# Patient Record
Sex: MALE | NOT HISPANIC OR LATINO | URBAN - METROPOLITAN AREA
[De-identification: names, ages, dates, MRNs, and addresses within clinical notes are randomized per-mention and may not be internally consistent; named-entity substitution may affect disease eponyms.]

---

## 2018-08-21 ENCOUNTER — OFFICE VISIT (OUTPATIENT)
Dept: URGENT CARE | Facility: CLINIC | Age: 48
End: 2018-08-21
Payer: COMMERCIAL

## 2018-08-21 VITALS
WEIGHT: 196 LBS | RESPIRATION RATE: 14 BRPM | HEIGHT: 72 IN | TEMPERATURE: 97.6 F | DIASTOLIC BLOOD PRESSURE: 66 MMHG | HEART RATE: 62 BPM | OXYGEN SATURATION: 95 % | BODY MASS INDEX: 26.55 KG/M2 | SYSTOLIC BLOOD PRESSURE: 120 MMHG

## 2018-08-21 DIAGNOSIS — L23.7 ALLERGIC CONTACT DERMATITIS DUE TO PLANTS, EXCEPT FOOD: Primary | ICD-10-CM

## 2018-08-21 PROCEDURE — G0382 LEV 3 HOSP TYPE B ED VISIT: HCPCS | Performed by: FAMILY MEDICINE

## 2018-08-21 RX ORDER — METHYLPREDNISOLONE 4 MG/1
TABLET ORAL
Qty: 21 TABLET | Refills: 0 | Status: SHIPPED | OUTPATIENT
Start: 2018-08-21

## 2018-08-21 NOTE — PROGRESS NOTES
Assessment/Plan:      Diagnoses and all orders for this visit:    Allergic contact dermatitis due to plants, except food  -     Methylprednisolone 4 MG TBPK; Use as directed on package  -     betamethasone valerate (VALISONE) 0 1 % cream; Apply topically 2 (two) times a day          Subjective:     Patient ID: Bird Pierson is a 57 y o  male  Patient is a 57-year-old male who was cutting grass 4 - 5 days ago  Subsequently his eyelids have been puffy and itchy  He also has noted what he calls hives about his face and neck  Review of Systems   Constitutional: Negative  HENT: Negative  Eyes: Positive for itching  Respiratory: Negative  Cardiovascular: Negative  Musculoskeletal: Negative  Objective:     Physical Exam   Constitutional: He is oriented to person, place, and time  He appears well-developed and well-nourished  HENT:   Head: Normocephalic and atraumatic  Eyes: Conjunctivae are normal    His eyelids are puffy bilaterally  Pulmonary/Chest: Effort normal and breath sounds normal    Musculoskeletal: Normal range of motion  Neurological: He is alert and oriented to person, place, and time  Psychiatric: He has a normal mood and affect

## 2018-08-21 NOTE — PATIENT INSTRUCTIONS
We are treating this as an allergic reaction possibly to poison ivy  Use medications as written  Consider an antihistamine such as Benadryl or Zyrtec for the itch

## 2020-09-05 ENCOUNTER — OFFICE VISIT (OUTPATIENT)
Dept: URGENT CARE | Facility: CLINIC | Age: 50
End: 2020-09-05
Payer: COMMERCIAL

## 2020-09-05 VITALS
TEMPERATURE: 98.7 F | SYSTOLIC BLOOD PRESSURE: 145 MMHG | WEIGHT: 189 LBS | BODY MASS INDEX: 25.6 KG/M2 | HEART RATE: 66 BPM | HEIGHT: 72 IN | DIASTOLIC BLOOD PRESSURE: 75 MMHG

## 2020-09-05 DIAGNOSIS — L25.5 DERMATITIS DUE TO PLANTS, INCLUDING POISON IVY, SUMAC, AND OAK: Primary | ICD-10-CM

## 2020-09-05 PROCEDURE — G0382 LEV 3 HOSP TYPE B ED VISIT: HCPCS | Performed by: PHYSICIAN ASSISTANT

## 2020-09-05 PROCEDURE — 99283 EMERGENCY DEPT VISIT LOW MDM: CPT | Performed by: PHYSICIAN ASSISTANT

## 2020-09-05 RX ORDER — PREDNISONE 20 MG/1
TABLET ORAL
Qty: 24 TABLET | Refills: 0 | Status: SHIPPED | OUTPATIENT
Start: 2020-09-05

## 2020-09-05 NOTE — PATIENT INSTRUCTIONS
Poison Ivy   WHAT YOU NEED TO KNOW:   Poison ivy is a plant that can cause an itchy, uncomfortable rash on your skin  Poison ivy grows as a shrub or vine in woods, fields, and areas of thick [place]  It has 3 bright green leaves on each stem that turn red in [place]  DISCHARGE INSTRUCTIONS:   Medicines:   · Antiseptic or drying creams or ointments: These medicines may be used to dry out the rash and decrease the itching  These products may be available without a doctor's order  · Steroids: This medicine helps decrease itching and inflammation  It can be given as a cream to apply to your skin or as a pill  · Antihistamines: This medicine may help decrease itching and help you sleep  It is available without a doctor's order  · Take your medicine as directed  Contact your healthcare provider if you think your medicine is not helping or if you have side effects  Tell him of her if you are allergic to any medicine  Keep a list of the medicines, vitamins, and herbs you take  Include the amounts, and when and why you take them  Bring the list or the pill bottles to follow-up visits  Carry your medicine list with you in case of an emergency  Follow up with your healthcare provider as directed:  Write down your questions so you remember to ask them during your visits  How your poison ivy rash spreads: You cannot spread poison ivy by touching your rash or the liquid from your blisters  Poison ivy is spread only if you scratch your skin while it still has oil on it  You may think your rash is spreading because new rashes appear over a number of days  This happens because areas covered by thin skin break out in a rash first  Your face or forearms may develop a rash before thicker areas, such as the palms of your hands  Self-care:   · Keep your rash clean and dry:  Wash it with soap and water  Gently pat it dry with a clean towel  · Try not to scratch or rub your rash:   This can cause your skin to become infected  · Use a compress on your rash:  Dip a clean washcloth in cool water  Wring it out and place it on your rash  Leave the washcloth on your skin for 15 minutes  Do this at least 3 times per day  · Take a cornstarch or oatmeal bath: If your rash is too large to cover with wet washcloths, take 3 or 4 cornstarch baths daily  Mix 1 pound of cornstarch with a little water to make a paste  Add the paste to a tub full of water and mix well  You may also use colloidal oatmeal in the bath water  Use lukewarm water  Avoid hot water because it may cause your itching to increase  Prevent a poison ivy rash in the future:   · Wear skin protection:  Wear long pants, a long-sleeved shirt, and gloves  Use a skin block lotion to protect your skin from poison ivy oil  You can find this at a drugstore without a prescription  · Wash clothing after possible exposure: If you think you have been near a poison ivy plant, wash the clothes you were wearing separately from other clothes  Rinse the washing machine well after you take the clothes out  Scrub boots and shoes with warm, soapy water  Dry clean items and clothing that you cannot wash in water  Poison ivy oil is sticky and can stay on surfaces for a long time  It can cause a new rash even years later  · Bathe your pet:  Use warm water and shampoo on your pet's fur  This will prevent the spread of oil to your skin, car, and home  Wear long sleeves, long pants, and gloves while washing pets or any items that may have oil on them  · Reduce exposure to poison ivy:  Do not touch plants that look like poison ivy  Keep your yard free of poison ivy  While protecting your skin, remove the plant and the roots  Place them in a plastic bag and seal the bag tightly  · Do not burn poison ivy plants: This can spread the oil through the air  If you breathe the oil into your lungs, you could have swelling and serious breathing problems   Oil that clings to the fire yoon can land on your skin and cause a rash  Contact your healthcare provider if:   · You have pus, soft yellow scabs, or tenderness on the rash  · The itching gets worse or keeps you awake at night  · The rash covers more than 1/4 of your skin or spreads to your eyes, mouth, or genital area  · The rash is not better after 2 to 3 weeks  · You have tender, swollen glands on the sides of your neck  · You have swelling in your arms and legs  · You have questions or concerns about your condition or care  Return to the emergency department if:   · You have a fever  · You have redness, swelling, and tenderness around the rash  · You have trouble breathing  © 2017 [facility] Information is for End User's use only and may not be sold, redistributed or otherwise used for commercial purposes  All illustrations and images included in CareNotes® are the copyrighted property of A D A M , Inc  or Vince Tovar  The above information is an  only  It is not intended as medical advice for individual conditions or treatments  Talk to your doctor, nurse or pharmacist before following any medical regimen to see if it is safe and effective for you

## 2020-09-10 NOTE — PROGRESS NOTES
[facility]        NAME: Willa Yoon is a 52 y o  male  : 1970    MRN: [id number]  DATE: September 10, 2020  TIME: 2:49 AM    Assessment and Plan   Dermatitis due to plants, including poison ivy, sumac, and oak [L25 5]  1  Dermatitis due to plants, including poison ivy, sumac, and oak  predniSONE 20 mg tablet         Patient Instructions       Follow up with PCP in 3-5 days  Proceed to  ER if symptoms worsen  Chief Complaint     Chief Complaint   Patient presents with   [address]     poison ivy started last week, he was working outside and started seeing blisters and rash           History of Present Illness       82-year-old male presents the clinic with poison ivy that started last week  Patient states that he was working outside and must have been exposed to poison ivy  Patient started noticing blisters to several fingers and his hands, forearm  Patient states he has had poison ivy before and it is similar to previous episodes of poison that he has had in the past       Review of Systems   Review of Systems   Constitutional: Negative for chills and fever  HENT: Negative for facial swelling, sore throat, trouble swallowing and voice change  Eyes: Negative for photophobia, pain, discharge, redness, itching and visual disturbance  Respiratory: Negative for cough, chest tightness, shortness of breath and wheezing  Cardiovascular: Negative for chest pain and palpitations  Musculoskeletal: Negative for myalgias  Skin: Positive for rash  Negative for color change  Neurological: Negative for dizziness, weakness, light-headedness and headaches           Current Medications       Current Outpatient Medications:     betamethasone valerate (VALISONE) 0 1 % cream, Apply topically 2 (two) times a day (Patient not taking: Reported on 2020), Disp: 30 g, Rfl: 0    Methylprednisolone 4 MG TBPK, Use as directed on package (Patient not taking: Reported on 2020), Disp: 21 tablet, Rfl: 0    predniSONE 20 mg tablet, Take 3 tablets for 4 days, then  2 tablets for 4 days, then 1 tablet for 4 days  Take in the morning with food  , Disp: 24 tablet, Rfl: 0    Current Allergies     Allergies as of 09/05/2020    (No Known Allergies)            The following portions of the patient's history were reviewed and updated as appropriate: allergies, current medications, past family history, past medical history, past social history, past surgical history and problem list      History reviewed  No pertinent past medical history  Past Surgical History:   Procedure Laterality Date    HERNIA REPAIR      KNEE SURGERY         Family History   Problem Relation Age of Onset    No Known Problems Mother     Heart disease Father          Medications have been verified  Objective   /75   Pulse 66   Temp 98 7 °F (37 1 °C)   Ht 6' (1 829 m)   Wt 85 7 kg (189 lb)   BMI 25 63 kg/m²        Physical Exam     Physical Exam  Vitals signs and nursing note reviewed  Constitutional:       General: He is not in acute distress  Appearance: He is well-developed  He is not diaphoretic  HENT:      Head: Normocephalic and atraumatic  Pulmonary:      Effort: Pulmonary effort is normal    Musculoskeletal: Normal range of motion  Skin:     General: Skin is warm and dry  Findings: Rash present  No erythema  Rash is vesicular  Comments: Erythematous macular papular rash with small vesicles noted, blanchable, excoriations are noted  Neurological:      Mental Status: He is alert and oriented to person, place, and time

## 2021-03-29 DIAGNOSIS — Z23 ENCOUNTER FOR IMMUNIZATION: ICD-10-CM

## 2021-05-14 ENCOUNTER — OFFICE VISIT (OUTPATIENT)
Dept: URGENT CARE | Facility: CLINIC | Age: 51
End: 2021-05-14
Payer: COMMERCIAL

## 2021-05-14 VITALS — HEART RATE: 70 BPM | TEMPERATURE: 97.1 F | RESPIRATION RATE: 16 BRPM | OXYGEN SATURATION: 97 %

## 2021-05-14 DIAGNOSIS — H65.03 BILATERAL ACUTE SEROUS OTITIS MEDIA, RECURRENCE NOT SPECIFIED: Primary | ICD-10-CM

## 2021-05-14 PROCEDURE — 99283 EMERGENCY DEPT VISIT LOW MDM: CPT | Performed by: PHYSICIAN ASSISTANT

## 2021-05-14 PROCEDURE — G0382 LEV 3 HOSP TYPE B ED VISIT: HCPCS | Performed by: PHYSICIAN ASSISTANT

## 2021-05-14 RX ORDER — TENOFOVIR ALAFENAMIDE 25 MG/1
TABLET ORAL
COMMUNITY
Start: 2021-04-02

## 2021-05-14 NOTE — PATIENT INSTRUCTIONS
Serous Otitis Media   WHAT YOU NEED TO KNOW:   Serous otitis media is fluid trapped behind your tympanic membrane (eardrum), without an ear infection  Your eardrum is in your middle ear  Serous otitis media is also called otitis media with effusion  You may have fluid in your ear for months, but it usually goes away on its own  The fluid may be in one or both ears  The fluid may cause muffled sounds, and you may feel like your ears are full  Serous otitis media may be caused by an upper respiratory infection or allergies  It is most common in the fall and early spring  DISCHARGE INSTRUCTIONS:   Return to the emergency department if:   · You have a fever  · You have a sudden loss of hearing in your affected ear  · You develop a severe headache and stiff neck  · You have a seizure  Contact your healthcare provider if:   · You have fluid draining from your ear  · You have new symptoms  · You have questions or concerns about your condition or care  Follow up with your healthcare provider as directed: Your ears will need to be checked regularly  You may need to see a specialist  Write down your questions so you remember to ask them during your visits  © Copyright [address] Information is for End User's use only and may not be sold, redistributed or otherwise used for commercial purposes  All illustrations and images included in CareNotes® are the copyrighted property of A D A M , Inc  or [facility] Lisa Samaniego   The above information is an  only  It is not intended as medical advice for individual conditions or treatments  Talk to your doctor, nurse or pharmacist before following any medical regimen to see if it is safe and effective for you

## 2021-05-14 NOTE — PROGRESS NOTES
[facility]        NAME: Marisol Xiao is a 52 y o  male  : 1970    MRN: [id number]  DATE: May 17, 2021  TIME: 8:36 AM    Assessment and Plan   Bilateral acute serous otitis media, recurrence not specified [H65 03]  1  Bilateral acute serous otitis media, recurrence not specified           Patient Instructions       Follow up with PCP in 3-5 days  Proceed to  ER if symptoms worsen  Chief Complaint     Chief Complaint   Patient presents with    Earache     pt reports post-nasal drip for about three weeks  his left ear has felt full and painful for about three days         History of Present Illness         51-year-old male presents to the clinic with left ear fullness and irritation for approximately 3 days  Patient states that he has had postnasal drip for approximately 3 weeks due to allergies  Patient states that normally he would not come in for this but he is flying tomorrow to [place] and is concerned that his ear pain could worsen  Review of Systems   Review of Systems   Constitutional: Negative for chills and fever  HENT: Positive for ear pain  Negative for congestion, ear discharge, facial swelling, hearing loss, rhinorrhea, sore throat and tinnitus  Neurological: Negative for dizziness, light-headedness and headaches  Current Medications       Current Outpatient Medications:     Tenofovir Alafenamide Fumarate (Vemlidy) 25 MG TABS, Take 1 tablet by mouth once daily, Disp: , Rfl:     betamethasone valerate (VALISONE) 0 1 % cream, Apply topically 2 (two) times a day (Patient not taking: Reported on 2020), Disp: 30 g, Rfl: 0    Methylprednisolone 4 MG TBPK, Use as directed on package (Patient not taking: Reported on 2020), Disp: 21 tablet, Rfl: 0    predniSONE 20 mg tablet, Take 3 tablets for 4 days, then  2 tablets for 4 days, then 1 tablet for 4 days  Take in the morning with food   (Patient not taking: Reported on 2021), Disp: 24 tablet, Rfl: 0    Current Allergies     Allergies as of 05/14/2021    (No Known Allergies)            The following portions of the patient's history were reviewed and updated as appropriate: allergies, current medications, past family history, past medical history, past social history, past surgical history and problem list      History reviewed  No pertinent past medical history  Past Surgical History:   Procedure Laterality Date    HERNIA REPAIR      KNEE SURGERY         Family History   Problem Relation Age of Onset    No Known Problems Mother     Heart disease Father          Medications have been verified  Objective   Pulse 70   Temp (!) 97 1 °F (36 2 °C)   Resp 16   SpO2 97%   No LMP for male patient  Physical Exam     Physical Exam  Vitals signs and nursing note reviewed  Constitutional:       General: He is not in acute distress  Appearance: He is well-developed  He is not diaphoretic  HENT:      Head: Normocephalic and atraumatic  Right Ear: A middle ear effusion is present  Tympanic membrane is not erythematous  Left Ear: A middle ear effusion is present  Tympanic membrane is not erythematous  Nose: Nose normal    Eyes:      Conjunctiva/sclera: Conjunctivae normal    Pulmonary:      Effort: Pulmonary effort is normal    Musculoskeletal: Normal range of motion  Skin:     General: Skin is warm and dry  Neurological:      Mental Status: He is alert and oriented to person, place, and time

## 2022-08-15 LAB — HBA1C MFR BLD HPLC: 5.3 %

## 2022-11-11 ENCOUNTER — OFFICE VISIT (OUTPATIENT)
Dept: ENDOCRINOLOGY | Facility: HOSPITAL | Age: 52
End: 2022-11-11

## 2022-11-11 VITALS
HEIGHT: 71 IN | DIASTOLIC BLOOD PRESSURE: 82 MMHG | WEIGHT: 202 LBS | SYSTOLIC BLOOD PRESSURE: 138 MMHG | HEART RATE: 84 BPM | BODY MASS INDEX: 28.28 KG/M2

## 2022-11-11 DIAGNOSIS — E04.9 GOITER: ICD-10-CM

## 2022-11-11 DIAGNOSIS — E04.1 LEFT THYROID NODULE: Primary | ICD-10-CM

## 2022-11-11 DIAGNOSIS — Z92.3 PERSONAL HISTORY OF RADIATION EXPOSURE: ICD-10-CM

## 2022-11-11 NOTE — PROGRESS NOTES
11/13/2022    Assessment/Plan      Diagnoses and all orders for this visit:    Left thyroid nodule  -     TSH, 3rd generation Lab Collect  -     T4, free Lab Collect  -     Thyroid Peroxidase and Thyroglobulin Antibodies  -     US thyroid; Future    Goiter  -     TSH, 3rd generation Lab Collect  -     T4, free Lab Collect  -     Thyroid Peroxidase and Thyroglobulin Antibodies  -     US thyroid; Future    Personal history of radiation exposure  -     TSH, 3rd generation Lab Collect  -     T4, free Lab Collect  -     Thyroid Peroxidase and Thyroglobulin Antibodies  -     US thyroid; Future        Assessment/Plan:  1  Left-sided thyroid nodule  He has had possible radiation exposure due to his previous residence in the [place] and possible exposure to the [place] radiation  He has not had an ultrasound performed since 2017  I will repeat a thyroid ultrasound now  I will also check thyroid levels with antibodies  2  Goiter  He has no compressive thyroid symptoms  I will be checking this with thyroid ultrasound  I have asked him to get a TSH with free T4, thyroid peroxidase and thyroglobulin antibodies along with a thyroid ultrasound performed at his earliest convenience  Follow-up will be determined based on these studies  CC:  Thyroid consult    History of Present Illness     HPI: Romario Bajwa is a 50y o  year old male with history of thyroid nodule and goiter for evaluation/consult  He had a thyroid nodule noted in 2017 when he had a sharp pain in his left neck after sneezing  He was seen by endocrinology and was followed over time as thyroid nodule was quite small and did not need biopsy at that time  He has also had a goiter in the past   Of note, [facility] when the [place] occurred and he was some radiation exposure in the area where he lived which was 10 times normal radiation    He does also have a family history of a mother who has a goiter and is post surgery  He is here as he notices something on the left side of his throat if he so has post year on the left  When he coughs the left is irritated and when he thinks he can sometimes have pain in the left side of his neck with certain notes  He has no actual difficulty with swallowing or prevention of swallowing  He has no heat or cold intolerance, palpitation, or fatigue  He denies insomnia but is a light sleeper and will wake frequently at night  He denies diarrhea or constipation, anxiety or depression  He does have a slight tremor  He denies dry skin, brittle nails, or hair loss  He denies diplopia  Weight is stable  Review of Systems   Constitutional: Negative for fatigue and unexpected weight change  HENT: Positive for trouble swallowing  Negative for hearing loss and tinnitus  Eyes: Positive for visual disturbance  Wears glasses  Slight diplopia again, eye wanders some more recently  Respiratory: Negative for chest tightness and shortness of breath  Cardiovascular: Negative for chest pain, palpitations and leg swelling  Gastrointestinal: Negative for abdominal pain, constipation, diarrhea and nausea  Endocrine: Negative for cold intolerance and heat intolerance  Musculoskeletal: Negative for arthralgias and back pain  Skin: Negative for rash  No dry skin  No brittle nails  No hair loss  Some thinning  Neurological: Positive for tremors and headaches  Negative for dizziness and light-headedness  Slight tremor, occasional headaches  Psychiatric/Behavioral: Negative for dysphoric mood and sleep disturbance  The patient is not nervous/anxious  Will wake frequently over night as light sleeper          Historical Information   Past Medical History:   Diagnosis Date   • Benign essential tremor    • Hepatitis B    • Hyperlipidemia      Past Surgical History:   Procedure Laterality Date   • HERNIA REPAIR Right    • KNEE SURGERY Right     torn meniscus   • NASAL SEPTUM SURGERY      septum once, rhinoscopy twice   • STRABISMUS SURGERY Left    • VASECTOMY Bilateral      Social History   Social History     Substance and Sexual Activity   Alcohol Use Yes    Comment: occasionally     Social History     Substance and Sexual Activity   Drug Use Never     Social History     Tobacco Use   Smoking Status Never Smoker   Smokeless Tobacco Never Used     Family History:   Family History   Problem Relation Age of Onset   • Thyroid disease unspecified Mother         goiter removed   • Heart disease Father    • Heart attack Father    • No Known Problems Son    • No Known Problems Son    • No Known Problems Son        Meds/Allergies   Current Outpatient Medications   Medication Sig Dispense Refill   • Tenofovir Alafenamide Fumarate (Vemlidy) 25 MG TABS Take 1 tablet by mouth once daily       No current facility-administered medications for this visit  No Known Allergies    Objective   Vitals: Blood pressure 138/82, pulse 84, height 5' 10 5" (1 791 m), weight 91 6 kg (202 lb)  Invasive Devices  Report    None                 Physical Exam  Vitals reviewed  Constitutional:       Appearance: Normal appearance  He is well-developed  HENT:      Head: Normocephalic and atraumatic  Eyes:      Extraocular Movements: Extraocular movements intact  Conjunctiva/sclera: Conjunctivae normal       Comments: No lid lag, stare, proptosis, or periorbital edema  Neck:      Thyroid: No thyromegaly  Vascular: No carotid bruit  Comments: tender over the left thyroid to palpitation  No bruits over the thyroid gland  Cardiovascular:      Rate and Rhythm: Normal rate and regular rhythm  Heart sounds: Normal heart sounds  No murmur heard  Pulmonary:      Effort: Pulmonary effort is normal       Breath sounds: Normal breath sounds  No wheezing  Abdominal:      General: Bowel sounds are normal       Palpations: Abdomen is soft  Tenderness: There is no abdominal tenderness  Musculoskeletal:         General: No deformity  Normal range of motion  Cervical back: Normal range of motion and neck supple  Right lower leg: No edema  Left lower leg: No edema  Comments: No tremor of the outstretched hands  No spinous process tenderness  No CVA tenderness  Lymphadenopathy:      Cervical: No cervical adenopathy  Skin:     General: Skin is warm and dry  Findings: No erythema or rash  Neurological:      Mental Status: He is alert and oriented to person, place, and time  Deep Tendon Reflexes: Reflexes are normal and symmetric  Comments: Patellar deep tendon reflexes normal          The history was obtained from the review of the chart and from the patient  Lab Results:      Blood work done on 08/15/2022 showed a TSH of 1 4  Blood work done on 12/06/2021 showed a TSH of 1 46  Thyroid ultrasound:     Thyroid ultrasound was performed in November of 2017 demonstrating a right lobe of the thyroid measuring 6 8 cm and a left lobe of the thyroid measuring 6 1 cm at their largest dimension  There was a 6 mm left-sided thyroid nodule        Future Appointments   Date Time Provider Yissel Bailey   11/29/2022  8:00 AM [facility] US UP 1 [facility] UP US [facility]B UPPER PER

## 2022-11-12 LAB
T4 FREE SERPL-MCNC: 1 NG/DL (ref 0.8–1.8)
THYROPEROXIDASE AB SERPL-ACNC: 1 IU/ML
TSH SERPL-ACNC: 0.84 MIU/L (ref 0.4–4.5)

## 2022-11-29 ENCOUNTER — HOSPITAL ENCOUNTER (OUTPATIENT)
Dept: ULTRASOUND IMAGING | Facility: CLINIC | Age: 52
Discharge: HOME | End: 2022-11-29

## 2022-11-29 DIAGNOSIS — E04.9 GOITER: ICD-10-CM

## 2022-11-29 DIAGNOSIS — Z92.3 PERSONAL HISTORY OF RADIATION EXPOSURE: ICD-10-CM

## 2022-11-29 DIAGNOSIS — E04.1 LEFT THYROID NODULE: ICD-10-CM

## 2022-12-02 DIAGNOSIS — E04.1 LEFT THYROID NODULE: Primary | ICD-10-CM

## 2023-02-02 ENCOUNTER — OFFICE VISIT (OUTPATIENT)
Dept: URGENT CARE | Facility: CLINIC | Age: 53
End: 2023-02-02

## 2023-02-02 VITALS — OXYGEN SATURATION: 97 % | TEMPERATURE: 97.8 F | RESPIRATION RATE: 18 BRPM | HEART RATE: 68 BPM

## 2023-02-02 DIAGNOSIS — S61.219A LACERATION WITHOUT FOREIGN BODY OF UNSPECIFIED FINGER WITHOUT DAMAGE TO NAIL, INITIAL ENCOUNTER: ICD-10-CM

## 2023-02-02 DIAGNOSIS — Z23 NEED FOR TETANUS BOOSTER: Primary | ICD-10-CM

## 2023-02-02 RX ORDER — CEPHALEXIN 500 MG/1
500 CAPSULE ORAL EVERY 8 HOURS SCHEDULED
Qty: 30 CAPSULE | Refills: 0 | Status: SHIPPED | OUTPATIENT
Start: 2023-02-02 | End: 2023-02-12

## 2023-02-02 NOTE — PROGRESS NOTES
[address] [facility]        NAME: Jaclyn Tan is a 50 y o  male  : 1970    MRN: [id number]  DATE: 2023  TIME: 5:59 PM    Pulse 68   Temp 97 8 °F (36 6 °C)   Resp 18   SpO2 97%     Assessment and Plan   Need for tetanus booster [Z23]  1  Need for tetanus booster  Tdap Vaccine greater than or equal to 6yo      2  Laceration without foreign body of unspecified finger without damage to nail, initial encounter  cephalexin (KEFLEX) 500 mg capsule            Patient Instructions       Follow up with PCP in 3-5 days  Proceed to  ER if symptoms worsen  Chief Complaint     Chief Complaint   Patient presents with   • Finger Laceration     Pt cut the tip of his right middle finger with a mandalin  He has been keeping it covered and using neosporin  History of Present Illness       Pt with right 3rd finger tip laceration from vegetable slicer       Review of Systems   Review of Systems   Constitutional: Negative  HENT: Negative  Eyes: Negative  Respiratory: Negative  Cardiovascular: Negative  Gastrointestinal: Negative  Endocrine: Negative  Genitourinary: Negative  Musculoskeletal: Negative  Skin: Negative  Allergic/Immunologic: Negative  Neurological: Negative  Hematological: Negative  Psychiatric/Behavioral: Negative  All other systems reviewed and are negative          Current Medications       Current Outpatient Medications:   •  cephalexin (KEFLEX) 500 mg capsule, Take 1 capsule (500 mg total) by mouth every 8 (eight) hours for 10 days, Disp: 30 capsule, Rfl: 0  •  Tenofovir Alafenamide Fumarate (Vemlidy) 25 MG TABS, Take 1 tablet by mouth once daily, Disp: , Rfl:     Current Allergies     Allergies as of 2023   • (No Known Allergies)            The following portions of the patient's history were reviewed and updated as appropriate: allergies, current medications, past family history, past medical history, past social history, past surgical history and problem list      Past Medical History:   Diagnosis Date   • Benign essential tremor    • Hepatitis B    • Hyperlipidemia        Past Surgical History:   Procedure Laterality Date   • HERNIA REPAIR Right    • KNEE SURGERY Right     torn meniscus   • NASAL SEPTUM SURGERY      septum once, rhinoscopy twice   • STRABISMUS SURGERY Left    • VASECTOMY Bilateral        Family History   Problem Relation Age of Onset   • Thyroid disease unspecified Mother         goiter removed   • Heart disease Father    • Heart attack Father    • No Known Problems Son    • No Known Problems Son    • No Known Problems Son          Medications have been verified  Objective   Pulse 68   Temp 97 8 °F (36 6 °C)   Resp 18   SpO2 97%        Physical Exam     Physical Exam  Vitals and nursing note reviewed  Constitutional:       Appearance: Normal appearance  He is normal weight  Comments: Wound cleansed with sure cleans and gel foam applied and bulky dressing    HENT:      Head: Normocephalic and atraumatic  Right Ear: Tympanic membrane, ear canal and external ear normal       Left Ear: Tympanic membrane, ear canal and external ear normal       Nose: Nose normal       Mouth/Throat:      Mouth: Mucous membranes are moist       Pharynx: Oropharynx is clear  Eyes:      Extraocular Movements: Extraocular movements intact  Conjunctiva/sclera: Conjunctivae normal       Pupils: Pupils are equal, round, and reactive to light  Cardiovascular:      Rate and Rhythm: Normal rate and regular rhythm  Pulses: Normal pulses  Heart sounds: Normal heart sounds  Pulmonary:      Effort: Pulmonary effort is normal       Breath sounds: Normal breath sounds  Abdominal:      General: Abdomen is flat  Bowel sounds are normal       Palpations: Abdomen is soft  Musculoskeletal:         General: Normal range of motion  Cervical back: Normal range of motion and neck supple        Comments: Right 3rd finger tip avulsion laceration    Skin:     General: Skin is warm  Capillary Refill: Capillary refill takes less than 2 seconds  Neurological:      General: No focal deficit present  Mental Status: He is alert and oriented to person, place, and time     Psychiatric:         Mood and Affect: Mood normal          Behavior: Behavior normal

## 2023-02-08 ENCOUNTER — OFFICE VISIT (OUTPATIENT)
Dept: URGENT CARE | Facility: CLINIC | Age: 53
End: 2023-02-08

## 2023-02-08 VITALS
SYSTOLIC BLOOD PRESSURE: 142 MMHG | BODY MASS INDEX: 27.58 KG/M2 | WEIGHT: 197 LBS | HEIGHT: 71 IN | OXYGEN SATURATION: 99 % | HEART RATE: 66 BPM | TEMPERATURE: 97.4 F | DIASTOLIC BLOOD PRESSURE: 82 MMHG | RESPIRATION RATE: 16 BRPM

## 2023-02-08 DIAGNOSIS — Z51.89 VISIT FOR WOUND CARE: Primary | ICD-10-CM

## 2023-02-08 NOTE — PROGRESS NOTES
[facility]        NAME: Oziel Spencer is a 50 y o  male  : 1970    MRN: [id number]  DATE: 2023  TIME: 5:43 PM    /82   Pulse 66   Temp (!) 97 4 °F (36 3 °C)   Resp 16   Ht 5' 11" (1 803 m)   Wt 89 4 kg (197 lb)   SpO2 99%   BMI 27 48 kg/m²     Assessment and Plan   Visit for wound care [Z51 89]  1  Visit for wound care              Patient Instructions       Follow up with PCP in 3-5 days  Proceed to  ER if symptoms worsen  Chief Complaint     Chief Complaint   Patient presents with   • Skin Problem     Pt returns for wound check  Seen on  for right middle finger injury  Denies fevers  Dressing remains intact  Did not start Keflex  History of Present Illness       Pt with right 3rd finger avulision laceration check       Review of Systems   Review of Systems   Constitutional: Negative  HENT: Negative  Eyes: Negative  Respiratory: Negative  Cardiovascular: Negative  Gastrointestinal: Negative  Endocrine: Negative  Genitourinary: Negative  Musculoskeletal: Negative  Skin: Negative  Allergic/Immunologic: Negative  Neurological: Negative  Hematological: Negative  Psychiatric/Behavioral: Negative  All other systems reviewed and are negative          Current Medications       Current Outpatient Medications:   •  Tenofovir Alafenamide Fumarate (Vemlidy) 25 MG TABS, Take 1 tablet by mouth once daily, Disp: , Rfl:   •  cephalexin (KEFLEX) 500 mg capsule, Take 1 capsule (500 mg total) by mouth every 8 (eight) hours for 10 days (Patient not taking: Reported on 2023), Disp: 30 capsule, Rfl: 0    Current Allergies     Allergies as of 2023   • (No Known Allergies)            The following portions of the patient's history were reviewed and updated as appropriate: allergies, current medications, past family history, past medical history, past social history, past surgical history and problem list      Past Medical History: Diagnosis Date   • Benign essential tremor    • Hepatitis B    • Hyperlipidemia        Past Surgical History:   Procedure Laterality Date   • HERNIA REPAIR Right    • KNEE SURGERY Right     torn meniscus   • NASAL SEPTUM SURGERY      septum once, rhinoscopy twice   • STRABISMUS SURGERY Left    • VASECTOMY Bilateral        Family History   Problem Relation Age of Onset   • Thyroid disease unspecified Mother         goiter removed   • Heart disease Father    • Heart attack Father    • No Known Problems Son    • No Known Problems Son    • No Known Problems Son          Medications have been verified  Objective   /82   Pulse 66   Temp (!) 97 4 °F (36 3 °C)   Resp 16   Ht 5' 11" (1 803 m)   Wt 89 4 kg (197 lb)   SpO2 99%   BMI 27 48 kg/m²        Physical Exam     Physical Exam  Vitals and nursing note reviewed  Constitutional:       Appearance: Normal appearance  He is normal weight  HENT:      Head: Normocephalic and atraumatic  Right Ear: Tympanic membrane, ear canal and external ear normal       Left Ear: Tympanic membrane, ear canal and external ear normal       Nose: Nose normal       Mouth/Throat:      Mouth: Mucous membranes are moist       Pharynx: Oropharynx is clear  Eyes:      Extraocular Movements: Extraocular movements intact  Conjunctiva/sclera: Conjunctivae normal       Pupils: Pupils are equal, round, and reactive to light  Cardiovascular:      Rate and Rhythm: Normal rate and regular rhythm  Pulses: Normal pulses  Heart sounds: Normal heart sounds  Pulmonary:      Effort: Pulmonary effort is normal       Breath sounds: Normal breath sounds  Abdominal:      General: Abdomen is flat  Bowel sounds are normal       Palpations: Abdomen is soft  Musculoskeletal:         General: Normal range of motion  Cervical back: Normal range of motion and neck supple        Comments: Right 3rd finger gel foam intact healing well no erythema no swelling no tenderness  Sensation wnl    Skin:     General: Skin is warm  Neurological:      General: No focal deficit present  Mental Status: He is alert and oriented to person, place, and time     Psychiatric:         Mood and Affect: Mood normal

## 2023-03-03 ENCOUNTER — OFFICE VISIT (OUTPATIENT)
Dept: GASTROENTEROLOGY | Facility: CLINIC | Age: 53
End: 2023-03-03

## 2023-03-03 ENCOUNTER — TELEPHONE (OUTPATIENT)
Dept: GASTROENTEROLOGY | Facility: CLINIC | Age: 53
End: 2023-03-03

## 2023-03-03 VITALS
WEIGHT: 199 LBS | DIASTOLIC BLOOD PRESSURE: 78 MMHG | SYSTOLIC BLOOD PRESSURE: 138 MMHG | BODY MASS INDEX: 27.86 KG/M2 | HEIGHT: 71 IN

## 2023-03-03 DIAGNOSIS — R10.13 EPIGASTRIC PAIN: Primary | ICD-10-CM

## 2023-03-03 DIAGNOSIS — Z12.11 COLON CANCER SCREENING: ICD-10-CM

## 2023-03-03 DIAGNOSIS — B19.10 HEPATITIS B INFECTION WITHOUT DELTA AGENT WITHOUT HEPATIC COMA, UNSPECIFIED CHRONICITY: ICD-10-CM

## 2023-03-03 RX ORDER — FAMOTIDINE 40 MG/1
40 TABLET, FILM COATED ORAL
Qty: 30 TABLET | Refills: 2 | Status: SHIPPED | OUTPATIENT
Start: 2023-03-03

## 2023-03-03 NOTE — LETTER
March 3, 2023     Elyssa Robles MD  [facility] [address]  [address]    Patient: Dakotah Rosales   YOB: 1970   Date of Visit: 3/3/2023       Dear Dr Sana Sauceda: Thank you for referring Dakotah Rosales to me for evaluation  Below are my notes for this consultation  If you have questions, please do not hesitate to call me  I look forward to following your patient along with you  Sincerely,        Nathan Baires MD        CC: MD Nathan Armstrong MD  3/3/2023  3:50 PM  Sign when Signing Visit  [address] [facility] - Outpatient Consultation  Dakotah Rosales 50 y o  male MRN: [id number]  Encounter: [id number]          ASSESSMENT AND PLAN:      1  Epigastric pain  Several months of upper abdominal pain  Suspicious for GERD  At risk for H  pylori secondary to his emigration from [place]  - EGD; Future  - famotidine (PEPCID) 40 MG tablet; Take 1 tablet (40 mg total) by mouth daily at bedtime  Dispense: 30 tablet; Refill: 2    2  Colon cancer screening  Average risk for colon cancer screening   - Colonoscopy; Future  - sodium picosulfate, magnesium oxide, citric acid 10-3 5-12 MG-GM -GM/160ML SOLN; Take 160 mL by mouth once for 1 dose Take 160 mL by mouth once for 1 dose  Dispense: 160 mL; Refill: 0    3  Chronic hepatitis B  On Vemlidy  Followed by Dr Raymond Irby at [facility]    ______________________________________________________________________    HPI: The patient is seen in the office today for upper abdominal pain and to discuss colon cancer screening  Has a history of chronic hepatitis B and is currently seeing Dr Raymond Irby at [facility]  He reports over the last several months having increasing epigastric abdominal burning with some regurgitation  He denies any real heartburn  He denies any dysphagia, odynophagia, or early satiety  Red wine seems to make this worse  Coughing sometimes makes it worse  Spicy foods also bother him    Denies any significant NSAIDs  He has not tried antacids  His weight is stable  His bowels are normal       REVIEW OF SYSTEMS:    CONSTITUTIONAL: Denies any fever, chills, rigors, and weight loss  HEENT: No earache or tinnitus  Denies hearing loss or visual disturbances  CARDIOVASCULAR: No chest pain or palpitations  RESPIRATORY: Denies any cough, hemoptysis, shortness of breath or dyspnea on exertion  GASTROINTESTINAL: As noted in the History of Present Illness  GENITOURINARY: No problems with urination  Denies any hematuria or dysuria  NEUROLOGIC: No dizziness or vertigo, denies headaches  MUSCULOSKELETAL: Denies any muscle or joint pain  SKIN: Denies skin rashes or itching  ENDOCRINE: Denies excessive thirst  Denies intolerance to heat or cold  PSYCHOSOCIAL: Denies depression or anxiety  Denies any recent memory loss         Historical Information   Past Medical History:   Diagnosis Date   • Benign essential tremor    • Hepatitis B    • Hyperlipidemia      Past Surgical History:   Procedure Laterality Date   • HERNIA REPAIR Right    • KNEE SURGERY Right     torn meniscus   • NASAL SEPTUM SURGERY      septum once, rhinoscopy twice   • STRABISMUS SURGERY Left    • VASECTOMY Bilateral      Social History   Social History     Substance and Sexual Activity   Alcohol Use Yes    Comment: occasionally     Social History     Substance and Sexual Activity   Drug Use Never     Social History     Tobacco Use   Smoking Status Never   Smokeless Tobacco Never     Family History   Problem Relation Age of Onset   • Thyroid disease unspecified Mother         goiter removed   • Heart disease Father    • Heart attack Father    • No Known Problems Son    • No Known Problems Son    • No Known Problems Son        Meds/Allergies       Current Outpatient Medications:   •  famotidine (PEPCID) 40 MG tablet  •  sodium picosulfate, magnesium oxide, citric acid 10-3 5-12 MG-GM -GM/160ML SOLN  •  Tenofovir Alafenamide Fumarate (Vemlidy) 25 MG TABS    No Known Allergies        Objective     Blood pressure 138/78, height 5' 11" (1 803 m), weight 90 3 kg (199 lb)  Body mass index is 27 75 kg/m²  PHYSICAL EXAM:      General Appearance:   Alert, cooperative, no distress   HEENT:   Normocephalic, atraumatic, anicteric      Neck:  Supple, symmetrical, trachea midline   Lungs:   Clear to auscultation bilaterally; no rales, rhonchi or wheezing; respirations unlabored    Heart[de-identified]   Regular rate and rhythm; no murmur, rub, or gallop  Abdomen:   Soft, non-tender, non-distended; normal bowel sounds; no masses, no organomegaly    Genitalia:   Deferred    Rectal:   Deferred    Extremities:  No cyanosis, clubbing or edema    Pulses:  2+ and symmetric    Skin:  No jaundice, rashes, or lesions    Lymph nodes:  No palpable cervical lymphadenopathy        Lab Results:   No visits with results within 1 Day(s) from this visit  Latest known visit with results is:   Orders Only on 11/11/2022   Component Date Value   • Thyroid Peroxidase Antib* 11/11/2022 1    • Free t4 11/11/2022 1 0    • TSH 11/11/2022 0 84          Radiology Results:   No results found

## 2023-03-03 NOTE — LETTER
March 3, 2023     [facility] Hayden MD  [facility] [address] [address]    Patient: Ayanna Dawson   YOB: 1970   Date of Visit: 3/3/2023       Dear Dr Donny Severance: Thank you for referring Ayanna Dawson to me for evaluation  Below are my notes for this consultation  If you have questions, please do not hesitate to call me  I look forward to following your patient along with you  Sincerely,        Robby Anderson MD        CC: MD Robby Tom MD  3/3/2023  3:48 PM  Sign when Signing Visit  [address] [facility] [facility] - Outpatient Consultation  Ayanna Dawson 50 y o  male MRN: [id number]  Encounter: [id number]          ASSESSMENT AND PLAN:      1  Epigastric pain  Several months of upper abdominal pain  Suspicious for GERD  At risk for H  pylori secondary to his emigration from [place]  - EGD; Future  - famotidine (PEPCID) 40 MG tablet; Take 1 tablet (40 mg total) by mouth daily at bedtime  Dispense: 30 tablet; Refill: 2    2  Colon cancer screening  Average risk for colon cancer screening   - Colonoscopy; Future  - sodium picosulfate, magnesium oxide, citric acid 10-3 5-12 MG-GM -GM/160ML SOLN; Take 160 mL by mouth once for 1 dose Take 160 mL by mouth once for 1 dose  Dispense: 160 mL; Refill: 0    3  And is currently seeing Dr Skashi Muñiz without delta agent without hepatic coma, unspecified chronicity  ***    ______________________________________________________________________    HPI: The patient is seen in the office today for upper abdominal pain and to discuss colon cancer screening  Has a history of chronic hepatitis B and is currently seeing Dr Jesus Trent at [facility]  He reports over the last several months having increasing epigastric abdominal burning with some regurgitation  He denies any real heartburn  He denies any dysphagia, odynophagia, or early satiety  Red wine seems to make this worse    Coughing sometimes makes it worse   Spicy foods also bother him  Denies any significant NSAIDs  He has not tried antacids  His weight is stable  His bowels are normal       REVIEW OF SYSTEMS:    CONSTITUTIONAL: Denies any fever, chills, rigors, and weight loss  HEENT: No earache or tinnitus  Denies hearing loss or visual disturbances  CARDIOVASCULAR: No chest pain or palpitations  RESPIRATORY: Denies any cough, hemoptysis, shortness of breath or dyspnea on exertion  GASTROINTESTINAL: As noted in the History of Present Illness  GENITOURINARY: No problems with urination  Denies any hematuria or dysuria  NEUROLOGIC: No dizziness or vertigo, denies headaches  MUSCULOSKELETAL: Denies any muscle or joint pain  SKIN: Denies skin rashes or itching  ENDOCRINE: Denies excessive thirst  Denies intolerance to heat or cold  PSYCHOSOCIAL: Denies depression or anxiety  Denies any recent memory loss         Historical Information   Past Medical History:   Diagnosis Date   • Benign essential tremor    • Hepatitis B    • Hyperlipidemia      Past Surgical History:   Procedure Laterality Date   • HERNIA REPAIR Right    • KNEE SURGERY Right     torn meniscus   • NASAL SEPTUM SURGERY      septum once, rhinoscopy twice   • STRABISMUS SURGERY Left    • VASECTOMY Bilateral      Social History   Social History     Substance and Sexual Activity   Alcohol Use Yes    Comment: occasionally     Social History     Substance and Sexual Activity   Drug Use Never     Social History     Tobacco Use   Smoking Status Never   Smokeless Tobacco Never     Family History   Problem Relation Age of Onset   • Thyroid disease unspecified Mother         goiter removed   • Heart disease Father    • Heart attack Father    • No Known Problems Son    • No Known Problems Son    • No Known Problems Son        Meds/Allergies       Current Outpatient Medications:   •  famotidine (PEPCID) 40 MG tablet  •  sodium picosulfate, magnesium oxide, citric acid 10-3 5-12 MG-GM -GM/160ML SOLN  •  Tenofovir Alafenamide Fumarate (Vemlidy) 25 MG TABS    No Known Allergies        Objective     Blood pressure 138/78, height 5' 11" (1 803 m), weight 90 3 kg (199 lb)  Body mass index is 27 75 kg/m²  PHYSICAL EXAM:      General Appearance:   Alert, cooperative, no distress   HEENT:   Normocephalic, atraumatic, anicteric      Neck:  Supple, symmetrical, trachea midline   Lungs:   Clear to auscultation bilaterally; no rales, rhonchi or wheezing; respirations unlabored    Heart[de-identified]   Regular rate and rhythm; no murmur, rub, or gallop  Abdomen:   Soft, non-tender, non-distended; normal bowel sounds; no masses, no organomegaly    Genitalia:   Deferred    Rectal:   Deferred    Extremities:  No cyanosis, clubbing or edema    Pulses:  2+ and symmetric    Skin:  No jaundice, rashes, or lesions    Lymph nodes:  No palpable cervical lymphadenopathy        Lab Results:   No visits with results within 1 Day(s) from this visit  Latest known visit with results is:   Orders Only on 11/11/2022   Component Date Value   • Thyroid Peroxidase Antib* 11/11/2022 1    • Free t4 11/11/2022 1 0    • TSH 11/11/2022 0 84          Radiology Results:   No results found

## 2023-03-03 NOTE — PROGRESS NOTES
[facility] - Outpatient Consultation  Keshav White 50 y o  male MRN: [id number]  Encounter: [id number]          ASSESSMENT AND PLAN:      1  Epigastric pain  Several months of upper abdominal pain  Suspicious for GERD  At risk for H  pylori secondary to his emigration from [place]  - EGD; Future  - famotidine (PEPCID) 40 MG tablet; Take 1 tablet (40 mg total) by mouth daily at bedtime  Dispense: 30 tablet; Refill: 2    2  Colon cancer screening  Average risk for colon cancer screening   - Colonoscopy; Future  - sodium picosulfate, magnesium oxide, citric acid 10-3 5-12 MG-GM -GM/160ML SOLN; Take 160 mL by mouth once for 1 dose Take 160 mL by mouth once for 1 dose  Dispense: 160 mL; Refill: 0    3  Chronic hepatitis B  On Vemlidy  Followed by Dr Isaias Cedeno at [facility]    ______________________________________________________________________    HPI: The patient is seen in the office today for upper abdominal pain and to discuss colon cancer screening  Has a history of chronic hepatitis B and is currently seeing Dr Isaias Cedeno at [facility]  He reports over the last several months having increasing epigastric abdominal burning with some regurgitation  He denies any real heartburn  He denies any dysphagia, odynophagia, or early satiety  Red wine seems to make this worse  Coughing sometimes makes it worse  Spicy foods also bother him  Denies any significant NSAIDs  He has not tried antacids  His weight is stable  His bowels are normal       REVIEW OF SYSTEMS:    CONSTITUTIONAL: Denies any fever, chills, rigors, and weight loss  HEENT: No earache or tinnitus  Denies hearing loss or visual disturbances  CARDIOVASCULAR: No chest pain or palpitations  RESPIRATORY: Denies any cough, hemoptysis, shortness of breath or dyspnea on exertion  GASTROINTESTINAL: As noted in the History of Present Illness  GENITOURINARY: No problems with urination   Denies any hematuria or dysuria  NEUROLOGIC: No dizziness or vertigo, denies headaches  MUSCULOSKELETAL: Denies any muscle or joint pain  SKIN: Denies skin rashes or itching  ENDOCRINE: Denies excessive thirst  Denies intolerance to heat or cold  PSYCHOSOCIAL: Denies depression or anxiety  Denies any recent memory loss  Historical Information   Past Medical History:   Diagnosis Date   • Benign essential tremor    • Hepatitis B    • Hyperlipidemia      Past Surgical History:   Procedure Laterality Date   • HERNIA REPAIR Right    • KNEE SURGERY Right     torn meniscus   • NASAL SEPTUM SURGERY      septum once, rhinoscopy twice   • STRABISMUS SURGERY Left    • VASECTOMY Bilateral      Social History   Social History     Substance and Sexual Activity   Alcohol Use Yes    Comment: occasionally     Social History     Substance and Sexual Activity   Drug Use Never     Social History     Tobacco Use   Smoking Status Never   Smokeless Tobacco Never     Family History   Problem Relation Age of Onset   • Thyroid disease unspecified Mother         goiter removed   • Heart disease Father    • Heart attack Father    • No Known Problems Son    • No Known Problems Son    • No Known Problems Son        Meds/Allergies       Current Outpatient Medications:   •  famotidine (PEPCID) 40 MG tablet  •  sodium picosulfate, magnesium oxide, citric acid 10-3 5-12 MG-GM -GM/160ML SOLN  •  Tenofovir Alafenamide Fumarate (Vemlidy) 25 MG TABS    No Known Allergies        Objective     Blood pressure 138/78, height 5' 11" (1 803 m), weight 90 3 kg (199 lb)  Body mass index is 27 75 kg/m²  PHYSICAL EXAM:      General Appearance:   Alert, cooperative, no distress   HEENT:   Normocephalic, atraumatic, anicteric      Neck:  Supple, symmetrical, trachea midline   Lungs:   Clear to auscultation bilaterally; no rales, rhonchi or wheezing; respirations unlabored    Heart[de-identified]   Regular rate and rhythm; no murmur, rub, or gallop     Abdomen:   Soft, non-tender, non-distended; normal bowel sounds; no masses, no organomegaly    Genitalia:   Deferred    Rectal:   Deferred    Extremities:  No cyanosis, clubbing or edema    Pulses:  2+ and symmetric    Skin:  No jaundice, rashes, or lesions    Lymph nodes:  No palpable cervical lymphadenopathy        Lab Results:   No visits with results within 1 Day(s) from this visit  Latest known visit with results is:   Orders Only on 11/11/2022   Component Date Value   • Thyroid Peroxidase Antib* 11/11/2022 1    • Free t4 11/11/2022 1 0    • TSH 11/11/2022 0 84          Radiology Results:   No results found

## 2023-03-03 NOTE — TELEPHONE ENCOUNTER
Scheduled date of colonoscopy (as of today): 4/17/2023  Physician performing colonoscopy: Dr Eduarda Pearson  Location of colonoscopy: [facility]  Bowel prep reviewed with patient: Clenpiq  Instructions reviewed with patient by: Gave pt instructions packet  Clearances: n/a

## 2023-03-25 DIAGNOSIS — R10.13 EPIGASTRIC PAIN: ICD-10-CM

## 2023-03-25 RX ORDER — FAMOTIDINE 40 MG/1
TABLET, FILM COATED ORAL
Qty: 90 TABLET | Refills: 1 | Status: SHIPPED | OUTPATIENT
Start: 2023-03-25

## 2023-04-03 ENCOUNTER — TELEPHONE (OUTPATIENT)
Dept: GASTROENTEROLOGY | Facility: CLINIC | Age: 53
End: 2023-04-03

## 2023-04-03 NOTE — TELEPHONE ENCOUNTER
Procedure confirmed  Colonoscopy/Endoscopy     Via: Voice mail    Instructions given: Given to Patient at Visit     Prep Given: Clenpiq    Call the office if there are any questions

## 2023-04-17 PROBLEM — K22.10 EROSIVE ESOPHAGITIS: Status: ACTIVE | Noted: 2023-04-17

## 2023-04-25 ENCOUNTER — TELEPHONE (OUTPATIENT)
Dept: GASTROENTEROLOGY | Facility: CLINIC | Age: 53
End: 2023-04-25

## 2023-04-25 NOTE — TELEPHONE ENCOUNTER
Kdt message not read by patient  I called patient today and had to leave voicemail to return call so we can relay info from Dr Flores Rebollar,  Your colon polyp biopsy results returned as a sessile serrated adenoma, which is a little different than a regular adenoma (but still no cancer!), so we should repeat your colonoscopy in five years instead of seven years  I see you have a follow up with Dr Idalia Adams scheduled--hope you're feeling better on the new medicine  Thanks!   Dr Jose Downs

## 2023-04-26 NOTE — TELEPHONE ENCOUNTER
Spoke with patient and advised as per Dr Gabriela Subramanian  Reviewed starting Nexium and let us know if it is not helping

## 2023-05-02 PROBLEM — Z12.11 COLON CANCER SCREENING: Status: RESOLVED | Noted: 2023-03-03 | Resolved: 2023-05-02

## 2023-08-09 ENCOUNTER — OFFICE VISIT (OUTPATIENT)
Dept: GASTROENTEROLOGY | Facility: CLINIC | Age: 53
End: 2023-08-09
Payer: COMMERCIAL

## 2023-08-09 VITALS
BODY MASS INDEX: 28 KG/M2 | DIASTOLIC BLOOD PRESSURE: 82 MMHG | WEIGHT: 200 LBS | HEIGHT: 71 IN | SYSTOLIC BLOOD PRESSURE: 124 MMHG

## 2023-08-09 DIAGNOSIS — B18.1 HEPATITIS B CARRIER (HCC): ICD-10-CM

## 2023-08-09 DIAGNOSIS — K22.10 EROSIVE ESOPHAGITIS: Primary | ICD-10-CM

## 2023-08-09 DIAGNOSIS — Z86.010 PERSONAL HISTORY OF COLONIC POLYPS: ICD-10-CM

## 2023-08-09 PROBLEM — Z86.0100 PERSONAL HISTORY OF COLONIC POLYPS: Status: ACTIVE | Noted: 2023-08-09

## 2023-08-09 PROCEDURE — 99213 OFFICE O/P EST LOW 20 MIN: CPT | Performed by: INTERNAL MEDICINE

## 2023-08-09 NOTE — PROGRESS NOTES
Monserrat Pathak [facility] - Outpatient Follow-up Note  Mitzi Arteaga 48 y.o. male MRN: [id number]  Encounter: [id number]    ASSESSMENT AND PLAN:      1. Erosive esophagitis  Previously on H2 blocker. EGD in April with erosive esophagitis. Symptoms improved on Nexium but mild recurrence when discontinued  -Restart Nexium 40 mg daily  -Plan on repeat EGD in 6 months    2. Personal history of colonic polyps  Last colonoscopy April 2023. 5-year recall    3. Hepatitis B carrier ([facility])  Followed by Dr. Marielena Wilson and [facility]      Followup Appointment: EGD in 6 months  ______________________________________________________________________    Chief Complaint   Patient presents with   • Follow up to colonoscopy and EGD     HPI: The patient is seen back in the office today. He was evaluated for GERD and had an EGD done in April which showed erosive esophagitis. Patient was on Pepcid at that time and was started on Nexium. He took Nexium 40 mg daily for about a month. He reports he felt great on that medication. He denies any heartburn or breakthrough symptoms. He stopped the Nexium after the first prescription. He reports that his heartburn has returned somewhat but not nearly as bad as previous. He denies any dysphagia, odynophagia, early satiety. He denies any chest or abdominal pain. He denies any GI bleeding. His weight is stable.     Historical Information   Past Medical History:   Diagnosis Date   • Benign essential tremor    • Erosive esophagitis 4/17/2023   • Hepatitis B    • Hyperlipidemia      Past Surgical History:   Procedure Laterality Date   • HERNIA REPAIR Right    • KNEE SURGERY Right     torn meniscus   • NASAL SEPTUM SURGERY      septum once, rhinoscopy twice   • STRABISMUS SURGERY Left    • VASECTOMY Bilateral      Social History     Substance and Sexual Activity   Alcohol Use Yes   • Alcohol/week: 2.0 - 3.0 standard drinks of alcohol   • Types: 2 - 3 Cans of beer per week Comment: occasionally     Social History     Substance and Sexual Activity   Drug Use Never     Social History     Tobacco Use   Smoking Status Never   Smokeless Tobacco Never     Family History   Problem Relation Age of Onset   • Thyroid disease unspecified Mother         goiter removed   • Heart disease Father    • Heart attack Father    • No Known Problems Son    • No Known Problems Son    • No Known Problems Son          Current Outpatient Medications:   •  esomeprazole (NexIUM) 40 MG capsule  •  Tenofovir Alafenamide Fumarate (Vemlidy) 25 MG TABS  No Known Allergies  Reviewed medications and allergies and updated as indicated    PHYSICAL EXAM:    Blood pressure 124/82, height 5' 11" (1.803 m), weight 90.7 kg (200 lb). Body mass index is 27.89 kg/m². General Appearance: NAD, cooperative, alert  Eyes: Anicteric, PERRLA, EOMI  ENT:  Normocephalic, atraumatic, normal mucosa. Neck:  Supple, symmetrical, trachea midline  Resp:  Clear to auscultation bilaterally; no rales, rhonchi or wheezing; respirations unlabored   CV:  S1 S2, Regular rate and rhythm; no murmur, rub, or gallop. GI:  Soft, non-tender, non-distended; normal bowel sounds; no masses, no organomegaly   Rectal: Deferred  Musculoskeletal: No cyanosis, clubbing or edema. Normal ROM. Skin:  No jaundice, rashes, or lesions   Heme/Lymph: No palpable cervical lymphadenopathy  Psych: Normal affect, good eye contact  Neuro: No gross deficits, AAOx3    Lab Results:   None recently    Radiology Results:   No results found.

## 2023-11-01 ENCOUNTER — TELEPHONE (OUTPATIENT)
Age: 53
End: 2023-11-01

## 2023-11-01 ENCOUNTER — PREP FOR PROCEDURE (OUTPATIENT)
Age: 53
End: 2023-11-01

## 2023-11-01 DIAGNOSIS — K22.10 EROSIVE ESOPHAGITIS: Primary | ICD-10-CM

## 2023-11-01 NOTE — TELEPHONE ENCOUNTER
OA Questions for EGD  Date: 11/1/23 Screened by: Chaparro Sams     Referring Provider:      Pre-Screening: BMI 27.89   Past EGD? If yes - Date: 4/17/2023  Physician/Facility:  Reason: Epigastric pain     SCHEDULING STAFF: If the patient is over 76years old, please schedule an office visit. ·      Does the patient want to see a gastroenterologist prior to their procedure to discuss any GI symptoms? NO  ·      Has the patient been hospitalized or had abdominal surgery in the past 6 months? NO  ·      Does the patient use supplemental oxygen? NO  ·      Does the patient take [Coumadin], [Lovenox], [Plavix], [Eliquis], [Xarelto], or other blood thinning medication? NO  ·      Has the patient had a stroke, cardiac event, or stent placed in the past year? NO     SCHEDULING STAFF: If patient answers NO to the above questions, then schedule the procedure. If patient answers YES to any of the above questions, then schedule an office appointment. ·       If a repeat EGD is belated and patient declines procedure à notify provider.

## 2023-11-01 NOTE — TELEPHONE ENCOUNTER
Scheduled date of EGD(as of today): 2/1/24  Physician performing EGD:   Location of EGD: [facility]  Clearances: N/A    Prep sent via email [email or web address]

## 2023-11-07 LAB — HBA1C MFR BLD HPLC: 5.3 %

## 2023-12-04 ENCOUNTER — OFFICE VISIT (OUTPATIENT)
Dept: ENDOCRINOLOGY | Facility: HOSPITAL | Age: 53
End: 2023-12-04
Payer: COMMERCIAL

## 2023-12-04 VITALS
HEIGHT: 71 IN | SYSTOLIC BLOOD PRESSURE: 110 MMHG | BODY MASS INDEX: 27.86 KG/M2 | HEART RATE: 78 BPM | WEIGHT: 199 LBS | DIASTOLIC BLOOD PRESSURE: 78 MMHG | OXYGEN SATURATION: 98 %

## 2023-12-04 DIAGNOSIS — E04.1 LEFT THYROID NODULE: ICD-10-CM

## 2023-12-04 DIAGNOSIS — Z92.3 PERSONAL HISTORY OF RADIATION EXPOSURE: ICD-10-CM

## 2023-12-04 DIAGNOSIS — E04.9 GOITER: Primary | ICD-10-CM

## 2023-12-04 PROCEDURE — 99213 OFFICE O/P EST LOW 20 MIN: CPT | Performed by: INTERNAL MEDICINE

## 2023-12-04 NOTE — PATIENT INSTRUCTIONS
The thyroid blood work. We'll repeat the thyroid ultrasound in 1 year. Follow up in 1 year with blood work and thyroid ultrasound.

## 2023-12-04 NOTE — PROGRESS NOTES
12/5/2023    Assessment/Plan     1. Goiter  -     T4, free Lab Collect; Future; Expected date: 11/04/2024  -     TSH, 3rd generation Lab Collect; Future; Expected date: 11/04/2024  -     US thyroid; Future; Expected date: 11/04/2024  -     T4, free Lab Collect  -     TSH, 3rd generation Lab Collect    2. Left thyroid nodule  -     T4, free Lab Collect; Future; Expected date: 11/04/2024  -     TSH, 3rd generation Lab Collect; Future; Expected date: 11/04/2024  -     US thyroid; Future; Expected date: 11/04/2024  -     T4, free Lab Collect  -     TSH, 3rd generation Lab Collect    3. Personal history of radiation exposure  -     T4, free Lab Collect; Future; Expected date: 11/04/2024  -     TSH, 3rd generation Lab Collect; Future; Expected date: 11/04/2024  -     US thyroid; Future; Expected date: 11/04/2024  -     T4, free Lab Collect  -     TSH, 3rd generation Lab Collect       1. Thyroid goiter with small left-sided thyroid nodule. Most recent thyroid function studies are normal signifying biochemical euthyroidism. His ultrasound in 11/2022 showed a stable very small thyroid nodule on the left with a goiter. He is biochemically and clinically euthyroid. He does have some minor compressive symptoms, but nothing significant. At this point, we will continue to follow him over time given his history of radiation exposure as a child. I have asked him to follow up in 1 year with preceding TSH, free T4, and thyroid ultrasound. CC: Thyroid nodule and goiter follow-up    HPI: Mr. Sp Barrera is a 72-year-old male here for follow-up visit regarding a thyroid nodule and goiter. He had originally noticed a sharp pain in the left side of his neck when sneezing in 2017 and a small thyroid nodule was noted on evaluation. It did not need biopsy at the time. He had also had a history of a goiter.  Of note, he was young and lived in the [place] when the [employer] accident occurred so may have had some sort of radiation exposure. He was seen in 11/2022 feeling as if something was in the left side of his throat and that it is irritating and causes him to cough and will sometimes have pain. An ultrasound showed a small 7 mm left sided thyroid nodule with a large goiter. Thyroid blood work was normal, and he was negative for thyroid antibodies. It was elected at the time to follow him over time with serial blood work and ultrasounds due to his history of radiation exposure as a child. He is on Vemlidy 25 mg once a day for his liver. He takes his Licorice medication in case he needs it for his stomach problem, which is between his diet, and it has helped him improve his condition. He feels sweaty at any time of the day with no physical activity, and he notices it more during the day, but he does not wake up in the middle of night. He denies any heart racing, palpitations, chest pain, depression, or dyspnea. He has a slight tremor. He had diarrhea and constipation when he had stomach issues. He started having heartburn. He had a short period of upset stomach. He started taking medication and it helped. He weaned himself off it. He had intermittent diarrhea. He sleeps well at night. He does not feel tired. He mentions that he frequently    wakes up at night and can go back to sleep easily. He has dry skin on his elbows. He has thinning hair. He tries to keep his nails short. He is overly anxious. 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 He states that he is not wearing eyeglasses but has undergone eye surgery for his eye condition. He has occasional pain in the left side of his throat and states that he feels it close to his vocal cords. He tends to get hoarse. He reports that he almost loses his voice due to talking in high pitch. His weight is consistent. His weight is fluctuating within 5 pounds, and currently, he is 199 pounds compared to last year when he weighed 202 pounds.     Review of Systems  The pertinent positive and negative findings are as noted in the HPI. Historical Information   Past Medical History:   Diagnosis Date    Benign essential tremor     Erosive esophagitis 4/17/2023    Hepatitis B     Hyperlipidemia      Past Surgical History:   Procedure Laterality Date    HERNIA REPAIR Right     KNEE SURGERY Right     torn meniscus    NASAL SEPTUM SURGERY      septum once, rhinoscopy twice    STRABISMUS SURGERY Left     VASECTOMY Bilateral      Social History   Social History     Substance and Sexual Activity   Alcohol Use Yes    Alcohol/week: 2.0 - 3.0 standard drinks of alcohol    Types: 2 - 3 Cans of beer per week    Comment: occasionally     Social History     Substance and Sexual Activity   Drug Use Never     Social History     Tobacco Use   Smoking Status Never   Smokeless Tobacco Never     Family History:   Family History   Problem Relation Age of Onset    Thyroid disease unspecified Mother         goiter removed    Heart disease Father     Heart attack Father     No Known Problems Son     No Known Problems Son     No Known Problems Son        Meds/Allergies   Current Outpatient Medications   Medication Sig Dispense Refill    Tenofovir Alafenamide Fumarate (Vemlidy) 25 MG TABS Take 1 tablet by mouth once daily      esomeprazole (NexIUM) 40 MG capsule Take 1 capsule (40 mg total) by mouth in the morning (Patient not taking: Reported on 12/4/2023) 90 capsule 3     No current facility-administered medications for this visit. No Known Allergies    Objective   Vitals: Blood pressure 110/78, pulse 78, height 5' 11" (1.803 m), weight 90.3 kg (199 lb), SpO2 98 %. Invasive Devices       None                   Physical Exam  Physical exam normal with pertinent positives and negatives. Eye: No lid lag, stare, proptosis, or periorbital edema. Neck: Thyroid remains enlarged without palpable nodules.  Slight tender area over the lower left neck without any evident, any palpable abnormality. Respiratory: Lungs clear. Cardiovascular: Heart regular. No murmurs. Neurovascular: No tremor of the outstretched hands and reflexes normal at the patellar area. The history was obtained from the review of the chart and from the patient. Lab Results:          Lab Results   Component Value Date    TSH 0.84 11/11/2022    FREET4 1.0 11/11/2022         Most recent blood work performed on 11/07/2023 at [facility] showed a TSH of 0.92. Future Appointments   Date Time Provider [address] [place] [address]   2/1/2024  7:30 AM Patricia Vargas MD [facility]   12/13/2024  8:00 AM Vika Curry MD [facility]       Transcribed for Vika Curry MD, by Jennifer Donohue on 12/05/23 at 5:15 PM. Powered by [employer].

## 2024-01-18 ENCOUNTER — ANESTHESIA (OUTPATIENT)
Dept: ANESTHESIOLOGY | Facility: AMBULATORY SURGERY CENTER | Age: 54
End: 2024-01-18

## 2024-01-18 ENCOUNTER — ANESTHESIA EVENT (OUTPATIENT)
Dept: ANESTHESIOLOGY | Facility: AMBULATORY SURGERY CENTER | Age: 54
End: 2024-01-18

## 2024-01-24 ENCOUNTER — TELEPHONE (OUTPATIENT)
Dept: GASTROENTEROLOGY | Facility: CLINIC | Age: 54
End: 2024-01-24

## 2024-01-24 NOTE — TELEPHONE ENCOUNTER
Procedure confirmed  Endoscopy     Via: Spoke with patient.      Instructions given: Email     Prep Given: N/A    Call the office if there are any questions.

## 2024-02-01 ENCOUNTER — ANESTHESIA (OUTPATIENT)
Dept: GASTROENTEROLOGY | Facility: AMBULATORY SURGERY CENTER | Age: 54
End: 2024-02-01

## 2024-02-01 ENCOUNTER — ANESTHESIA EVENT (OUTPATIENT)
Dept: GASTROENTEROLOGY | Facility: AMBULATORY SURGERY CENTER | Age: 54
End: 2024-02-01

## 2024-02-01 ENCOUNTER — HOSPITAL ENCOUNTER (OUTPATIENT)
Dept: GASTROENTEROLOGY | Facility: AMBULATORY SURGERY CENTER | Age: 54
Discharge: HOME | End: 2024-02-01
Payer: COMMERCIAL

## 2024-02-01 VITALS
HEIGHT: 71 IN | TEMPERATURE: 97.5 F | DIASTOLIC BLOOD PRESSURE: 73 MMHG | WEIGHT: 195 LBS | BODY MASS INDEX: 27.3 KG/M2 | SYSTOLIC BLOOD PRESSURE: 114 MMHG | OXYGEN SATURATION: 99 % | RESPIRATION RATE: 34 BRPM | HEART RATE: 60 BPM

## 2024-02-01 DIAGNOSIS — K22.10 EROSIVE ESOPHAGITIS: ICD-10-CM

## 2024-02-01 PROCEDURE — 88305 TISSUE EXAM BY PATHOLOGIST: CPT | Performed by: PATHOLOGY

## 2024-02-01 PROCEDURE — 88342 IMHCHEM/IMCYTCHM 1ST ANTB: CPT | Performed by: PATHOLOGY

## 2024-02-01 PROCEDURE — 43239 EGD BIOPSY SINGLE/MULTIPLE: CPT | Performed by: INTERNAL MEDICINE

## 2024-02-01 PROCEDURE — 88312 SPECIAL STAINS GROUP 1: CPT | Performed by: PATHOLOGY

## 2024-02-01 PROCEDURE — 88341 IMHCHEM/IMCYTCHM EA ADD ANTB: CPT | Performed by: PATHOLOGY

## 2024-02-01 RX ORDER — PROPOFOL 10 MG/ML
INJECTION, EMULSION INTRAVENOUS AS NEEDED
Status: DISCONTINUED | OUTPATIENT
Start: 2024-02-01 | End: 2024-02-01

## 2024-02-01 RX ORDER — SODIUM CHLORIDE, SODIUM LACTATE, POTASSIUM CHLORIDE, CALCIUM CHLORIDE 600; 310; 30; 20 MG/100ML; MG/100ML; MG/100ML; MG/100ML
50 INJECTION, SOLUTION INTRAVENOUS CONTINUOUS
Status: DISCONTINUED | OUTPATIENT
Start: 2024-02-01 | End: 2024-02-05 | Stop reason: HOSPADM

## 2024-02-01 RX ORDER — LIDOCAINE HYDROCHLORIDE 20 MG/ML
INJECTION, SOLUTION EPIDURAL; INFILTRATION; INTRACAUDAL; PERINEURAL AS NEEDED
Status: DISCONTINUED | OUTPATIENT
Start: 2024-02-01 | End: 2024-02-01

## 2024-02-01 RX ADMIN — SODIUM CHLORIDE, SODIUM LACTATE, POTASSIUM CHLORIDE, CALCIUM CHLORIDE 50 ML/HR: 600; 310; 30; 20 INJECTION, SOLUTION INTRAVENOUS at 10:46

## 2024-02-01 RX ADMIN — PROPOFOL 100 MG: 10 INJECTION, EMULSION INTRAVENOUS at 11:01

## 2024-02-01 RX ADMIN — LIDOCAINE HYDROCHLORIDE 50 MG: 20 INJECTION, SOLUTION EPIDURAL; INFILTRATION; INTRACAUDAL; PERINEURAL at 10:58

## 2024-02-01 RX ADMIN — PROPOFOL 200 MG: 10 INJECTION, EMULSION INTRAVENOUS at 10:58

## 2024-02-01 NOTE — ANESTHESIA PREPROCEDURE EVALUATION
Procedure:  EGD    Relevant Problems   ANESTHESIA (within normal limits)      CARDIO (within normal limits)      ENDO (within normal limits)      GI/HEPATIC   (+) Erosive esophagitis   (+) Hepatitis B   (+) Hepatitis B carrier (HCC)      /RENAL (within normal limits)      GYN (within normal limits)      HEMATOLOGY (within normal limits)      MUSCULOSKELETAL (within normal limits)      NEURO/PSYCH (within normal limits)      PULMONARY (within normal limits)      Endocrine   (+) Goiter   (+) Left thyroid nodule        Physical Exam    Airway    Mallampati score: IV  TM Distance: >3 FB  Neck ROM: full     Dental   No notable dental hx     Cardiovascular      Pulmonary      Other Findings        Anesthesia Plan  ASA Score- 2     Anesthesia Type- IV sedation with anesthesia with ASA Monitors.         Additional Monitors:     Airway Plan:            Plan Factors-Exercise tolerance (METS): >4 METS.    Chart reviewed.    Patient summary reviewed.    Patient is not a current smoker.              Induction- intravenous.    Postoperative Plan-     Informed Consent- Anesthetic plan and risks discussed with patient.  I personally reviewed this patient with the CRNA. Discussed and agreed on the Anesthesia Plan with the CRNA..

## 2024-02-01 NOTE — H&P
"History and Physical -  [facility]  Yo Kwong 53 y.o. male MRN: [id number]    HPI: Yo Kwong is a 53 y.o. year old male who presents for EGD secondary to erosive sophagitis    REVIEW OF SYSTEMS: Per the HPI, and otherwise unremarkable.    Historical Information   Past Medical History:   Diagnosis Date    Benign essential tremor     Erosive esophagitis 04/17/2023    Hepatitis B     Hepatitis B     Hyperlipidemia     Left thyroid nodule 11/11/2022     Past Surgical History:   Procedure Laterality Date    COLONOSCOPY      EGD      HERNIA REPAIR Right     KNEE SURGERY Right     torn meniscus    NASAL SEPTUM SURGERY      septum once, rhinoscopy twice    STRABISMUS SURGERY Left     VASECTOMY Bilateral      Social History   Social History     Substance and Sexual Activity   Alcohol Use Yes    Alcohol/week: 2.0 - 3.0 standard drinks of alcohol    Types: 2 - 3 Cans of beer per week    Comment: occasionally     Social History     Substance and Sexual Activity   Drug Use Never     Social History     Tobacco Use   Smoking Status Never   Smokeless Tobacco Never     Family History   Problem Relation Age of Onset    Thyroid disease unspecified Mother         goiter removed    Heart disease Father     Heart attack Father     No Known Problems Son     No Known Problems Son     No Known Problems Son        Meds/Allergies       Current Outpatient Medications:     Tenofovir Alafenamide Fumarate (Vemlidy) 25 MG TABS    esomeprazole (NexIUM) 40 MG capsule    Current Facility-Administered Medications:     lactated ringers infusion, 50 mL/hr, Intravenous, Continuous    No Known Allergies    Objective     /84   Pulse 62   Temp 97.5 °F (36.4 °C) (Temporal)   Resp 17   Ht 5' 11\" (1.803 m)   Wt 88.5 kg (195 lb)   SpO2 98%   BMI 27.20 kg/m²     PHYSICAL EXAM    Gen: NAD AAOx3  Head: Normocephalic, Atraumatic  CV: S1S2 RRR no m/r/g  CHEST: Clear b/l no c/r/w  ABD: soft, +BS NT/ND  EXT: no " edema    ASSESSMENT/PLAN:  This is a 53 y.o. year old male here for EGD, and he is stable and optimized for his procedure.

## 2024-02-01 NOTE — ANESTHESIA POSTPROCEDURE EVALUATION
Post-Op Assessment Note    CV Status:  Stable  Pain Score: 0    Pain management: adequate       Mental Status:  Arousable and sleepy   Hydration Status:  Stable   PONV Controlled:  Controlled   Airway Patency:  Patent     Post Op Vitals Reviewed: Yes    No anethesia notable event occurred.    Staff: CRNA               BP   128/82   Temp 97.6   Pulse 60   Resp 16   SpO2 99

## 2024-02-06 PROCEDURE — 88342 IMHCHEM/IMCYTCHM 1ST ANTB: CPT | Performed by: PATHOLOGY

## 2024-02-06 PROCEDURE — 88305 TISSUE EXAM BY PATHOLOGIST: CPT | Performed by: PATHOLOGY

## 2024-02-06 PROCEDURE — 88341 IMHCHEM/IMCYTCHM EA ADD ANTB: CPT | Performed by: PATHOLOGY

## 2024-02-06 PROCEDURE — 88312 SPECIAL STAINS GROUP 1: CPT | Performed by: PATHOLOGY

## 2024-06-04 NOTE — PATIENT INSTRUCTIONS
Let's do the neck and thyroid ultrasound  Let's get blood work  Follow up to be determined based on the studies 
Yes

## 2024-09-13 LAB
T4 FREE SERPL-MCNC: 0.9 NG/DL (ref 0.8–1.8)
TSH SERPL-ACNC: 0.79 MIU/L (ref 0.4–4.5)

## 2024-11-25 ENCOUNTER — HOSPITAL ENCOUNTER (OUTPATIENT)
Dept: ULTRASOUND IMAGING | Facility: HOSPITAL | Age: 54
Discharge: HOME | End: 2024-11-25
Attending: INTERNAL MEDICINE
Payer: COMMERCIAL

## 2024-11-25 DIAGNOSIS — Z92.3 PERSONAL HISTORY OF RADIATION EXPOSURE: ICD-10-CM

## 2024-11-25 DIAGNOSIS — E04.9 GOITER: ICD-10-CM

## 2024-11-25 DIAGNOSIS — E04.1 LEFT THYROID NODULE: ICD-10-CM

## 2024-11-25 PROCEDURE — 76536 US EXAM OF HEAD AND NECK: CPT

## 2024-11-29 ENCOUNTER — RESULTS FOLLOW-UP (OUTPATIENT)
Dept: ENDOCRINOLOGY | Facility: HOSPITAL | Age: 54
End: 2024-11-29

## 2024-12-13 ENCOUNTER — OFFICE VISIT (OUTPATIENT)
Dept: ENDOCRINOLOGY | Facility: HOSPITAL | Age: 54
End: 2024-12-13
Payer: COMMERCIAL

## 2024-12-13 VITALS
OXYGEN SATURATION: 95 % | SYSTOLIC BLOOD PRESSURE: 106 MMHG | HEART RATE: 65 BPM | BODY MASS INDEX: 28.5 KG/M2 | WEIGHT: 203.6 LBS | HEIGHT: 71 IN | DIASTOLIC BLOOD PRESSURE: 72 MMHG

## 2024-12-13 DIAGNOSIS — E04.9 GOITER: Primary | ICD-10-CM

## 2024-12-13 DIAGNOSIS — E04.1 LEFT THYROID NODULE: ICD-10-CM

## 2024-12-13 PROCEDURE — 99213 OFFICE O/P EST LOW 20 MIN: CPT | Performed by: PHYSICIAN ASSISTANT

## 2024-12-13 NOTE — PATIENT INSTRUCTIONS
Thyroid labs were normal.    Ultrasound did show a possible slight change.    Repeat ultrasound in 1 year.    Follow up in 1 year.

## 2024-12-13 NOTE — PROGRESS NOTES
Yo Kwong 54 y.o. male MRN: [id number]    Encounter: [id number]      Assessment & Plan     Assessment:  This is a 54 y.o.-year-old male with goiter with left sided thyroid nodule.    Plan:  Goiter with left sided thyroid nodule: Review of ultrasound shows that the nodule is stable in size.  It was read as the TI-RADS 4 compared to TI-RADS 3 2 years ago.  At this time we will repeat ultrasound in 1 year prior to next office visit, to verify changes, and see if things are stable.  No major symptoms at this time, and TSH and free T4 were normal.  At this time no large concerns, and we will continue to monitor.  Follow-up in 1 year with lab work and ultrasound.    CC: Thyroid follow-up    History of Present Illness     HPI:  Mr. Yo Kwong is a 54-year-old male here for follow-up visit regarding a thyroid nodule and goiter.      He had originally noticed a sharp pain in the left side of his neck when sneezing in 2017 and a small thyroid nodule was noted on evaluation. It did not need biopsy at the time. He had also had a history of a goiter. Of note, he was young and [place] occurred so may have had some sort of radiation exposure. He was seen in 11/2022 feeling as if something was in the left side of his throat and that it is irritating and causes him to cough and will sometimes have pain. An ultrasound showed a small 7 mm left sided thyroid nodule with a large goiter. Thyroid blood work was normal, and he was negative for thyroid antibodies. It was elected at the time to follow him over time with serial blood work and ultrasounds due to his history of radiation exposure as a child.     He is on Vemlidy 25 mg once a day for his liver. He takes his Licorice medication in case he needs it for his stomach problem, which is between his diet, and it has helped him improve his condition.      He feels sweaty at any time of the day with no physical activity, and he notices it more  during the day, but he does not wake up in the middle of night. He denies any heart racing, palpitations, chest pain, depression, or dyspnea. He has a slight tremor.     Otherwise he is doing well today.  Does continue to have some concerns with his thyroid.  Occasionally will have some dysphagia, and will have some thyroid tenderness especially during illnesses.  No concerns with any sleep disturbance at this time.  Denies any anxiety or depression, no pain, diarrhea or constipation, heat or cold intolerance.  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 He states that he is not wearing eyeglasses but has undergone eye surgery for his eye condition. He has occasional pain in the left side of his throat and states that he feels it close to his vocal cords. He tends to get hoarse. He reports that he almost loses his voice due to talking in high pitch. His weight is relatively consistent.  His weight is 4 pounds more than last year.    Review of Systems   Constitutional:  Negative for activity change, appetite change, chills, diaphoresis, fatigue and unexpected weight change.   HENT:  Positive for trouble swallowing (occasionally). Negative for sore throat and voice change.    Eyes:  Negative for visual disturbance.   Respiratory:  Negative for shortness of breath.    Cardiovascular:  Negative for palpitations and leg swelling.   Gastrointestinal:  Negative for abdominal pain, constipation and diarrhea.   Endocrine: Negative for cold intolerance, heat intolerance, polydipsia, polyphagia and polyuria.   Skin:  Negative for rash.   Neurological:  Negative for dizziness, tremors, weakness, light-headedness, numbness and headaches.   Hematological:  Negative for adenopathy.   Psychiatric/Behavioral:  Negative for dysphoric mood and sleep disturbance. The patient is not nervous/anxious.        Historical Information   Past Medical History:   Diagnosis Date   • Benign essential tremor    • Erosive  "esophagitis 04/17/2023   • Hepatitis B    • Hepatitis B    • Hyperlipidemia    • Left thyroid nodule 11/11/2022     Past Surgical History:   Procedure Laterality Date   • COLONOSCOPY     • EGD     • HERNIA REPAIR Right    • KNEE SURGERY Right     torn meniscus   • NASAL SEPTUM SURGERY      septum once, rhinoscopy twice   • STRABISMUS SURGERY Left    • VASECTOMY Bilateral      Social History   Social History     Substance and Sexual Activity   Alcohol Use Yes   • Alcohol/week: 2.0 - 3.0 standard drinks of alcohol   • Types: 2 - 3 Cans of beer per week    Comment: occasionally     Social History     Substance and Sexual Activity   Drug Use Never     Social History     Tobacco Use   Smoking Status Never   Smokeless Tobacco Never     Family History:   Family History   Problem Relation Age of Onset   • Thyroid disease unspecified Mother         goiter removed   • Heart disease Father    • Heart attack Father    • No Known Problems Son    • No Known Problems Son    • No Known Problems Son        Meds/Allergies   Current Outpatient Medications   Medication Sig Dispense Refill   • B Complex Vitamins (B COMPLEX PO) Take by mouth     • Misc Natural Products (PROSTATE SUPPORT PO) Take by mouth     • Tenofovir Alafenamide Fumarate (Vemlidy) 25 MG TABS Take 1 tablet by mouth once daily       No current facility-administered medications for this visit.     No Known Allergies    Objective   Vitals: Blood pressure 106/72, pulse 65, height 5' 11\" (1.803 m), weight 92.4 kg (203 lb 9.6 oz), SpO2 95%.    Physical Exam  Vitals and nursing note reviewed.   Constitutional:       General: He is not in acute distress.     Appearance: Normal appearance. He is not diaphoretic.   HENT:      Head: Normocephalic and atraumatic.   Eyes:      General: No scleral icterus.     Extraocular Movements: Extraocular movements intact.      Conjunctiva/sclera: Conjunctivae normal.      Pupils: Pupils are equal, round, and reactive to light. " "  Cardiovascular:      Rate and Rhythm: Normal rate and regular rhythm.      Heart sounds: No murmur heard.  Pulmonary:      Effort: Pulmonary effort is normal. No respiratory distress.      Breath sounds: Normal breath sounds. No wheezing.   Musculoskeletal:      Right lower leg: No edema.      Left lower leg: No edema.   Lymphadenopathy:      Cervical: No cervical adenopathy.   Skin:     General: Skin is warm and dry.   Neurological:      Mental Status: He is alert and oriented to person, place, and time. Mental status is at baseline.      Sensory: No sensory deficit.      Gait: Gait normal.   Psychiatric:         Mood and Affect: Mood normal.         Behavior: Behavior normal.         Thought Content: Thought content normal.         The history was obtained from the review of the chart, patient.    Lab Results:   Lab Results   Component Value Date/Time    Free t4 0.9 09/13/2024 09:14 AM       Imaging Studies:   Results for orders placed during the hospital encounter of 11/25/24    US thyroid    Impression  No nodule meets current ACR criteria for requiring biopsy or follow-up ultrasounds.          Reference: ACR Thyroid Imaging, Reporting and Data System (TI-RADS): White Paper of the ACR TI-RADS Committee. J AM Mirela Radiol 2017;14:587-595. Additional recommendations based on American Thyroid Association 2015 guidelines.      Workstation performed: [id number]      Results Review Statement: I reviewed radiology reports from this admission including: Ultrasound(s).    Portions of the record may have been created with voice recognition software. Occasional wrong word or \"sound a like\" substitutions may have occurred due to the inherent limitations of voice recognition software. Read the chart carefully and recognize, using context, where substitutions have occurred.    "